# Patient Record
Sex: MALE | Race: BLACK OR AFRICAN AMERICAN | NOT HISPANIC OR LATINO | Employment: OTHER | URBAN - METROPOLITAN AREA
[De-identification: names, ages, dates, MRNs, and addresses within clinical notes are randomized per-mention and may not be internally consistent; named-entity substitution may affect disease eponyms.]

---

## 2017-11-07 ENCOUNTER — FOLLOW UP (OUTPATIENT)
Dept: URBAN - METROPOLITAN AREA CLINIC 11 | Facility: CLINIC | Age: 32
End: 2017-11-07

## 2017-11-07 ASSESSMENT — VISUAL ACUITY: OS_SC: 20/400

## 2017-11-07 ASSESSMENT — TONOMETRY: OS_IOP_MMHG: 22

## 2017-11-07 NOTE — PROCEDURE NOTE: CLINICAL
PROCEDURE NOTE: Avastin () #8 OS. Diagnosis: Proliferative Diabetic Retinopathy. Intravitreal injection of Avastin 1.25mg/0.05 ml was given. Injection site: 3-4 mm from the limbus. Patient tolerated procedure well. CF vision checked. There were no complications. Post-op instructions given. Lot #: [email or web address]. Expiration Date: 6810-90-00U97:00:00. Inventory Id: xi Cerrato

## 2017-11-07 NOTE — PATIENT DISCUSSION
Avastin #8 done today in the left eye. I will see him again in 3 weeks. I would like to give the heme. an opportunity to clear on it's own. If it doesn't, we will discuss vitrectomy surgery.

## 2018-01-02 ENCOUNTER — FOLLOW UP (OUTPATIENT)
Dept: URBAN - METROPOLITAN AREA CLINIC 11 | Facility: CLINIC | Age: 33
End: 2018-01-02

## 2018-01-02 VITALS — DIASTOLIC BLOOD PRESSURE: 70 MMHG | HEIGHT: 60 IN | SYSTOLIC BLOOD PRESSURE: 112 MMHG

## 2018-01-02 ASSESSMENT — VISUAL ACUITY: OS_SC: 20/200

## 2018-01-02 ASSESSMENT — TONOMETRY: OS_IOP_MMHG: 24

## 2018-01-02 NOTE — PROCEDURE NOTE: CLINICAL
PROCEDURE NOTE: Avastin () #9 OS. Diagnosis: Proliferative Diabetic Retinopathy. Anesthesia: Topical/Subconjunctival. Prep: Betadine Drops and Betadine Scrub. Intravitreal injection of Avastin 1.25mg/0.05 ml was given. Injection site: 3-4 mm from the limbus. Patient tolerated procedure well. CF vision checked. There were no complications. Post-op instructions given. Lot #: [email or web address]. Expiration Date: Mon Feb 19 2018 00:00:00 GMT-0500 [place] Standard Time). Inventory Id: null. Junie Perry

## 2018-04-12 ENCOUNTER — FOLLOW UP (OUTPATIENT)
Dept: URBAN - METROPOLITAN AREA CLINIC 11 | Facility: CLINIC | Age: 33
End: 2018-04-12

## 2018-04-12 ASSESSMENT — TONOMETRY
OS_IOP_MMHG: 17
OD_IOP_MMHG: 08

## 2018-04-12 ASSESSMENT — VISUAL ACUITY: OS_SC: 20/100

## 2018-04-19 ENCOUNTER — FOLLOW UP (OUTPATIENT)
Dept: URBAN - METROPOLITAN AREA CLINIC 11 | Facility: CLINIC | Age: 33
End: 2018-04-19

## 2018-04-19 ASSESSMENT — VISUAL ACUITY: OS_SC: 20/400

## 2018-04-19 ASSESSMENT — TONOMETRY: OS_IOP_MMHG: 24

## 2018-04-19 NOTE — PATIENT DISCUSSION
Discussed vitreous heme with patient. Will defer any injection in the left eye today.  I reassured him that the remaining blood in the eye should clear on its own over the next few weeks.

## 2018-05-17 ENCOUNTER — FOLLOW UP (OUTPATIENT)
Dept: URBAN - METROPOLITAN AREA CLINIC 11 | Facility: CLINIC | Age: 33
End: 2018-05-17

## 2018-05-17 ASSESSMENT — TONOMETRY: OS_IOP_MMHG: 23

## 2018-05-17 ASSESSMENT — VISUAL ACUITY: OS_SC: 20/200

## 2018-05-17 NOTE — PATIENT DISCUSSION
Recommended PRP in his left eye today in hopes that we can stop the hemorrhages.  He may need additional PRP his next visit.

## 2018-05-17 NOTE — PROCEDURE NOTE: CLINICAL
PROCEDURE NOTE: PRP OS. Diagnosis: Diabetes, Uncontrolled Type I with Ocular Complications. Anesthesia: Topical. Prior to PRP, risks/benefits/alternatives to laser discussed including loss of vision, decreased peripheral and night vision and patient wished to proceed. Spot size: 200 um. Power: 230 mW. Number of pulses: 564 Duration: 0.10. Patient tolerated procedure well. There were no complications. Post procedure instructions given. Derick Crystal

## 2018-07-12 ENCOUNTER — FOLLOW UP (OUTPATIENT)
Dept: URBAN - METROPOLITAN AREA CLINIC 11 | Facility: CLINIC | Age: 33
End: 2018-07-12

## 2018-07-12 ASSESSMENT — TONOMETRY: OS_IOP_MMHG: 10

## 2018-07-12 ASSESSMENT — VISUAL ACUITY: OS_SC: 20/70-2

## 2018-07-12 NOTE — PATIENT DISCUSSION
Continue current management, lubricant drops as needed, fish oil 3,000 mg by mouth daily, and warm compresses 10 minutes daily.

## 2018-07-12 NOTE — PATIENT DISCUSSION
Recommended Avastin injection today. The injection was given and tolerated well by patient. Post-injection instructions were reviewed and understood by the patient.

## 2018-07-12 NOTE — PROCEDURE NOTE: CLINICAL
PROCEDURE NOTE: Avastin () #10 OS. Diagnosis: Proliferative Diabetic Retinopathy. Anesthesia: Topical. Prep: Betadine Drops and Betadine Scrub. Intravitreal injection of Avastin 1.25mg/0.05 ml was given. Injection site: 3-4 mm from the limbus. Patient tolerated procedure well. CF vision checked. There were no complications. Post-op instructions given. Lot #: [id number]. Expiration Date: . EXP. Inventory Id: xi Sampson

## 2018-12-06 ENCOUNTER — FOLLOW UP (OUTPATIENT)
Dept: URBAN - METROPOLITAN AREA CLINIC 11 | Facility: CLINIC | Age: 33
End: 2018-12-06

## 2018-12-06 ASSESSMENT — TONOMETRY: OS_IOP_MMHG: 22

## 2018-12-06 ASSESSMENT — VISUAL ACUITY
OS_SC: 20/200
OS_PH: 20/60-1

## 2019-01-24 ENCOUNTER — FOLLOW UP (OUTPATIENT)
Dept: URBAN - METROPOLITAN AREA CLINIC 11 | Facility: CLINIC | Age: 34
End: 2019-01-24

## 2019-01-24 ASSESSMENT — VISUAL ACUITY
OS_SC: 20/100+1
OS_PH: 20/70

## 2019-01-24 ASSESSMENT — TONOMETRY: OS_IOP_MMHG: 19

## 2019-01-24 NOTE — PATIENT DISCUSSION
Discussed with patient that the hemorrhage is clearing slowly in his left eye.  I advised him to keep his appointment for the next 1-2 weeks for Avastin injection in the left eye.

## 2019-04-25 ENCOUNTER — FOLLOW UP (OUTPATIENT)
Dept: URBAN - METROPOLITAN AREA CLINIC 11 | Facility: CLINIC | Age: 34
End: 2019-04-25

## 2019-04-25 ASSESSMENT — TONOMETRY: OS_IOP_MMHG: 16

## 2019-04-25 ASSESSMENT — VISUAL ACUITY: OS_SC: 20/70-2

## 2019-04-25 NOTE — PATIENT DISCUSSION
Patient will be seeing Dr. Kunal Marcus in the next few weeks for refraction.  I will plan to see him againin 2 months.

## 2019-07-18 ENCOUNTER — FOLLOW UP (OUTPATIENT)
Dept: URBAN - METROPOLITAN AREA CLINIC 11 | Facility: CLINIC | Age: 34
End: 2019-07-18

## 2019-07-18 ASSESSMENT — VISUAL ACUITY: OS_SC: 20/40-2

## 2019-07-18 ASSESSMENT — TONOMETRY: OS_IOP_MMHG: 09

## 2019-07-18 NOTE — PROCEDURE NOTE: CLINICAL
PROCEDURE NOTE: Avastin () #15 OS. Diagnosis: Proliferative Diabetic Retinopathy. Anesthesia: Topical/Subconjunctival. Prep: Betadine Drops and Betadine Scrub. Intravitreal injection of Avastin 1.25mg/0.05 ml was given. Injection site: 3-4 mm from the limbus. Patient tolerated procedure well. CF vision checked. There were no complications. Post-op instructions given. Lot #: [id number]. Expiration Date: Sun Sep 01 2019 00:00:00 [address] Daylight Time). Inventory Id: null. Corona Micki

## 2019-11-05 ENCOUNTER — FOLLOW UP (OUTPATIENT)
Dept: URBAN - METROPOLITAN AREA CLINIC 11 | Facility: CLINIC | Age: 34
End: 2019-11-05

## 2019-11-05 ASSESSMENT — VISUAL ACUITY: OS_CC: 20/40-2

## 2019-12-12 ENCOUNTER — FOLLOW UP (OUTPATIENT)
Dept: URBAN - METROPOLITAN AREA CLINIC 11 | Facility: CLINIC | Age: 34
End: 2019-12-12

## 2019-12-12 ASSESSMENT — VISUAL ACUITY: OS_SC: 20/400

## 2019-12-12 ASSESSMENT — TONOMETRY: OS_IOP_MMHG: 18

## 2019-12-12 NOTE — PATIENT DISCUSSION
Recommended Avastin injection today.  Discussed benefits, alternatives, realistic expectations and risks .  Patient desires to proceed with Avastin injection today.

## 2019-12-12 NOTE — PROCEDURE NOTE: CLINICAL
PROCEDURE NOTE: Avastin () #16 OS. Diagnosis: Proliferative Diabetic Retinopathy. Anesthesia: Topical/Subconjunctival. Prep: Betadine Drops and Betadine Scrub. Intravitreal injection of Avastin 1.25mg/0.05 ml was given. Injection site: 3-4 mm from the limbus. Patient tolerated procedure well. CF vision checked. There were no complications. Post-op instructions given. Lot #: [id number]. Expiration Date: 01/2020. Inventory Id: xi Catalan

## 2020-06-23 ENCOUNTER — FOLLOW UP (OUTPATIENT)
Dept: URBAN - METROPOLITAN AREA CLINIC 11 | Facility: CLINIC | Age: 35
End: 2020-06-23

## 2020-06-23 ASSESSMENT — TONOMETRY: OS_IOP_MMHG: 14

## 2020-06-23 ASSESSMENT — VISUAL ACUITY: OS_SC: 20/200+1

## 2020-11-19 ENCOUNTER — FOLLOW UP (OUTPATIENT)
Dept: URBAN - METROPOLITAN AREA CLINIC 11 | Facility: CLINIC | Age: 35
End: 2020-11-19

## 2020-11-19 ASSESSMENT — TONOMETRY: OS_IOP_MMHG: 15

## 2020-11-19 ASSESSMENT — VISUAL ACUITY: OS_SC: 20/200

## 2021-02-02 ENCOUNTER — FOLLOW UP (OUTPATIENT)
Dept: URBAN - METROPOLITAN AREA CLINIC 11 | Facility: CLINIC | Age: 36
End: 2021-02-02

## 2021-02-02 ASSESSMENT — VISUAL ACUITY: OS_SC: 20/200

## 2021-02-02 ASSESSMENT — TONOMETRY: OS_IOP_MMHG: 14

## 2021-02-02 NOTE — PATIENT DISCUSSION
Patient ready for cataract evaluation and possible surgery of his left eye. I have recommended him see Dr. Juana Gutierrez at his earliest convivence. The patient can be reached at [phone].

## 2021-04-06 ENCOUNTER — FOLLOW UP (OUTPATIENT)
Dept: URBAN - METROPOLITAN AREA CLINIC 11 | Facility: CLINIC | Age: 36
End: 2021-04-06

## 2021-04-06 ASSESSMENT — VISUAL ACUITY: OS_SC: 20/100

## 2021-04-06 ASSESSMENT — TONOMETRY: OS_IOP_MMHG: 13

## 2021-04-06 NOTE — PATIENT DISCUSSION
25 minutes spent in dialogue with Mr. , His retinopathy remains stable and is cleared to proceed with cataract surgery as scheduled. I will see him in 2 months.

## 2021-07-15 ENCOUNTER — FOLLOW UP (OUTPATIENT)
Dept: URBAN - METROPOLITAN AREA CLINIC 11 | Facility: CLINIC | Age: 36
End: 2021-07-15

## 2021-07-15 DIAGNOSIS — E10.3533: ICD-10-CM

## 2021-07-15 DIAGNOSIS — E10.3512: ICD-10-CM

## 2021-07-15 DIAGNOSIS — H43.12: ICD-10-CM

## 2021-07-15 PROCEDURE — 67028 INJECTION EYE DRUG: CPT

## 2021-07-15 PROCEDURE — 99214 OFFICE O/P EST MOD 30 MIN: CPT

## 2021-07-15 PROCEDURE — 76512 OPH US DX B-SCAN: CPT

## 2021-07-15 ASSESSMENT — TONOMETRY: OS_IOP_MMHG: 17

## 2021-07-15 ASSESSMENT — VISUAL ACUITY: OS_SC: CF 1FT

## 2021-07-15 NOTE — PATIENT DISCUSSION
30 minutes spent in face to face dialogue with Mr. Connors. I have recommended that he have an injection of Avastin while he is here today, due to a re-current hemorrhage. I will see him in 3 weeks for re-evaluation.

## 2021-07-15 NOTE — PROCEDURE NOTE: CLINICAL
PROCEDURE NOTE: Avastin () #18 OS. Diagnosis: Proliferative Diabetic Retinopathy. Anesthesia: Topical/Subconjunctival. Prep: Betadine Drops and Betadine Scrub. Intravitreal injection of Avastin 1.25mg/0.05 ml was given. Injection site: 3-4 mm from the limbus. Patient tolerated procedure well. CF vision checked. There were no complications. Post-op instructions given. Lot #: [id number]. Expiration Date: 9885-40-94G38:00:00. Inventory Id: xi Sampson

## 2021-10-05 ENCOUNTER — FOLLOW UP (OUTPATIENT)
Dept: URBAN - METROPOLITAN AREA CLINIC 11 | Facility: CLINIC | Age: 36
End: 2021-10-05

## 2021-10-05 DIAGNOSIS — E10.3512: ICD-10-CM

## 2021-10-05 DIAGNOSIS — H43.12: ICD-10-CM

## 2021-10-05 DIAGNOSIS — E10.3533: ICD-10-CM

## 2021-10-05 PROCEDURE — 99214 OFFICE O/P EST MOD 30 MIN: CPT

## 2021-10-05 ASSESSMENT — VISUAL ACUITY: OS_SC: 20/200

## 2021-10-05 ASSESSMENT — TONOMETRY: OS_IOP_MMHG: 17

## 2021-10-05 NOTE — PATIENT DISCUSSION
35 minutes spent in face to face dialogue with Mr. Connors. I have deferred treatment today and he will return to see me in 6 weeks.  I reassured him that the hemorrhage is clearing slowly.

## 2021-11-16 ENCOUNTER — FOLLOW UP (OUTPATIENT)
Dept: URBAN - METROPOLITAN AREA CLINIC 11 | Facility: CLINIC | Age: 36
End: 2021-11-16

## 2021-11-16 DIAGNOSIS — E10.3512: ICD-10-CM

## 2021-11-16 DIAGNOSIS — H43.12: ICD-10-CM

## 2021-11-16 PROCEDURE — 99213 OFFICE O/P EST LOW 20 MIN: CPT

## 2021-11-16 PROCEDURE — 92134 CPTRZ OPH DX IMG PST SGM RTA: CPT

## 2021-11-16 PROCEDURE — 67028 INJECTION EYE DRUG: CPT

## 2021-11-16 ASSESSMENT — TONOMETRY: OS_IOP_MMHG: 24

## 2021-11-16 ASSESSMENT — VISUAL ACUITY: OS_SC: 20/100-1

## 2021-11-16 NOTE — PATIENT DISCUSSION
Recommended Avastin injection in the left eye today. The injection was given and tolerated well by patient. Post-injection instructions were reviewed and understood by the patient.

## 2021-11-16 NOTE — PROCEDURE NOTE: CLINICAL
PROCEDURE NOTE: Avastin () #19 OS. Diagnosis: Diabetic Macular Edema. Anesthesia: Topical/Subconjunctival. Prep: Betadine Drops and Betadine Scrub. Intravitreal injection of Avastin 1.25mg/0.05 ml was given. Injection site: 3-4 mm from the limbus. Patient tolerated procedure well. CF vision checked. There were no complications. Post-op instructions given. Lot #: [id number]. Expiration Date: 8303-85-29T88:00:00. Inventory Id: rikki. Pennye Hand

## 2021-11-16 NOTE — PATIENT DISCUSSION
35 minutes spent in face to face dialogue with Mr. Connors. I have recommended Avastin treatment today and he will return to see me in 6 weeks.  I reassured him that the hemorrhage is clearing slowly.

## 2022-05-31 ENCOUNTER — COMPREHENSIVE EXAM (OUTPATIENT)
Dept: URBAN - METROPOLITAN AREA CLINIC 11 | Facility: CLINIC | Age: 37
End: 2022-05-31

## 2022-05-31 DIAGNOSIS — E10.3591: ICD-10-CM

## 2022-05-31 DIAGNOSIS — H40.89: ICD-10-CM

## 2022-05-31 DIAGNOSIS — E10.3512: ICD-10-CM

## 2022-05-31 PROCEDURE — 99214 OFFICE O/P EST MOD 30 MIN: CPT

## 2022-05-31 PROCEDURE — 92134 CPTRZ OPH DX IMG PST SGM RTA: CPT

## 2022-05-31 ASSESSMENT — TONOMETRY: OS_IOP_MMHG: 17

## 2022-05-31 ASSESSMENT — VISUAL ACUITY
OS_PH: 20/70
OS_SC: 20/200

## 2022-05-31 NOTE — PATIENT DISCUSSION
Spent 35 min with patient face to face discussing my findings on exam and OCT today.   No active leakage nor edema.  No treatment is needed today.  I recommend patient see Dr. Liliam Kruse for refraction, we will call to help get patient an appt. .  I will see patient again in 6 mths to re-evaluate with a possibility of Avastin OS.

## 2022-06-20 NOTE — PATIENT DISCUSSION
----- Message from Rosaura Younger sent at 6/19/2022 11:11 PM EDT -----  Regarding: Test results   When will you call about my results?   Retinal exam findings communicated to Physician managing diabetes.

## 2022-06-28 ENCOUNTER — FOLLOW UP (OUTPATIENT)
Dept: URBAN - METROPOLITAN AREA CLINIC 11 | Facility: CLINIC | Age: 37
End: 2022-06-28

## 2022-06-28 DIAGNOSIS — E10.3591: ICD-10-CM

## 2022-06-28 DIAGNOSIS — E10.3512: ICD-10-CM

## 2022-06-28 PROCEDURE — 67028 INJECTION EYE DRUG: CPT

## 2022-06-28 PROCEDURE — 99214 OFFICE O/P EST MOD 30 MIN: CPT

## 2022-06-28 PROCEDURE — 92134 CPTRZ OPH DX IMG PST SGM RTA: CPT

## 2022-06-28 ASSESSMENT — VISUAL ACUITY: OS_SC: 20/200

## 2022-06-28 ASSESSMENT — TONOMETRY: OS_IOP_MMHG: 24

## 2022-06-28 NOTE — PROCEDURE NOTE: CLINICAL
PROCEDURE NOTE: Avastin () #20 OS. Diagnosis: Proliferative Diabetic Retinopathy. Anesthesia: Topical/Subconjunctival. Prep: Betadine Drops and Betadine Scrub. Betadine prep was performed. Product is within expiration date, and has been verified. An unopened 30 g needle was securely affixed to the 1 cc syringe. Excess drug and air bubbles were carefully expressed such that the plunger aligned with the .05 mL steffany on the syringe. A lid speculum was used. After the Betadine prep, intravitreal injection of Avastin 1.25mg/0.05 ml was given. Injection site: 3-4 mm from the limbus. The needle was withdrawn; retinal perfusion was verified. Lid speculum removed. The eye was irrigated with sterile irrigating solution. The betadine was washed away. Patient tolerated procedure well. Count fingers vision was verified. There were no complications. Patient given office phone number/answering service phone number. Post-injection instructions were reviewed and understood. Symptoms of RD and endophthalmitis following intravitreal injection were discussed. Patient advised to call right away if any loss of vision, new floaters, or eye pain. Post-op instructions given. Patient was given the standard instruction sheet. Appropriate follow-up was arranged. Yadira Grider

## 2022-08-02 ENCOUNTER — ESTABLISHED PATIENT (OUTPATIENT)
Dept: URBAN - METROPOLITAN AREA CLINIC 11 | Facility: CLINIC | Age: 37
End: 2022-08-02

## 2022-08-02 DIAGNOSIS — E10.3591: ICD-10-CM

## 2022-08-02 DIAGNOSIS — H40.89: ICD-10-CM

## 2022-08-02 DIAGNOSIS — E10.3512: ICD-10-CM

## 2022-08-02 PROCEDURE — 92134 CPTRZ OPH DX IMG PST SGM RTA: CPT

## 2022-08-02 PROCEDURE — 67028 INJECTION EYE DRUG: CPT

## 2022-08-02 PROCEDURE — 99214 OFFICE O/P EST MOD 30 MIN: CPT

## 2022-08-02 ASSESSMENT — VISUAL ACUITY
OS_SC: 20/200
OU_SC: 20/200

## 2022-08-02 ASSESSMENT — TONOMETRY: OS_IOP_MMHG: 19

## 2022-08-02 NOTE — PROCEDURE NOTE: CLINICAL
PROCEDURE NOTE: Avastin () #21 OS. Diagnosis: Diabetic Macular Edema. Anesthesia: Topical/Subconjunctival. Prep: Betadine Drops and Betadine Scrub. Betadine prep was performed. Product is within expiration date, and has been verified. An unopened 30 g needle was securely affixed to the 1 cc syringe. Excess drug and air bubbles were carefully expressed such that the plunger aligned with the .05 mL steffany on the syringe. A lid speculum was used. After the Betadine prep, intravitreal injection of Avastin 1.25mg/0.05 ml was given. Injection site: 3-4 mm from the limbus. The needle was withdrawn; retinal perfusion was verified. Lid speculum removed. The eye was irrigated with sterile irrigating solution. The betadine was washed away. Patient tolerated procedure well. Count fingers vision was verified. There were no complications. Patient given office phone number/answering service phone number. Post-injection instructions were reviewed and understood. Symptoms of RD and endophthalmitis following intravitreal injection were discussed. Patient advised to call right away if any loss of vision, new floaters, or eye pain. Post-op instructions given. Patient was given the standard instruction sheet. Appropriate follow-up was arranged. Jessi Erazo

## 2022-10-04 ENCOUNTER — ESTABLISHED PATIENT (OUTPATIENT)
Dept: URBAN - METROPOLITAN AREA CLINIC 11 | Facility: CLINIC | Age: 37
End: 2022-10-04

## 2022-10-04 DIAGNOSIS — H43.12: ICD-10-CM

## 2022-10-04 DIAGNOSIS — H40.89: ICD-10-CM

## 2022-10-04 DIAGNOSIS — E10.3512: ICD-10-CM

## 2022-10-04 DIAGNOSIS — E10.3591: ICD-10-CM

## 2022-10-04 PROCEDURE — 99214 OFFICE O/P EST MOD 30 MIN: CPT

## 2022-10-04 PROCEDURE — 92134 CPTRZ OPH DX IMG PST SGM RTA: CPT

## 2022-10-04 PROCEDURE — 92201 OPSCPY EXTND RTA DRAW UNI/BI: CPT

## 2022-10-04 ASSESSMENT — VISUAL ACUITY: OS_CC: 20/40+1

## 2022-10-04 ASSESSMENT — TONOMETRY: OS_IOP_MMHG: 17

## 2022-10-04 NOTE — PATIENT DISCUSSION
Proliferative Diabetic Retinopathy is present on examination.  I recommend patient return for a series of Laser treatments.  Return in 1 month for PRP OS.

## 2022-11-11 ENCOUNTER — CLINIC PROCEDURE ONLY (OUTPATIENT)
Dept: URBAN - METROPOLITAN AREA CLINIC 11 | Facility: CLINIC | Age: 37
End: 2022-11-11

## 2022-11-11 DIAGNOSIS — E10.3512: ICD-10-CM

## 2022-11-11 PROCEDURE — 67228 TREATMENT X10SV RETINOPATHY: CPT

## 2022-11-11 ASSESSMENT — TONOMETRY: OS_IOP_MMHG: 16

## 2022-11-11 ASSESSMENT — VISUAL ACUITY: OS_CC: 20/30-1

## 2022-11-11 NOTE — PROCEDURE NOTE: CLINICAL
PROCEDURE NOTE: PRP OS. Diagnosis: Proliferative Diabetic Retinopathy. Anesthesia: Topical. Prior to PRP, risks/benefits/alternatives to laser discussed including loss of vision, decreased peripheral and night vision and patient wished to proceed. Spot size: 200 um. Power: 240 mW. Number of pulses: 323. Patient tolerated procedure well. There were no complications. Post procedure instructions given. Morgan Gan

## 2022-11-11 NOTE — PATIENT DISCUSSION
PRP (Pan-retinal Photocoagulation Laser) done and tolerated well by patient.   No post op instructions needed.

## 2022-12-01 ENCOUNTER — FOLLOW UP (OUTPATIENT)
Dept: URBAN - METROPOLITAN AREA CLINIC 11 | Facility: CLINIC | Age: 37
End: 2022-12-01

## 2022-12-01 PROCEDURE — 92134 CPTRZ OPH DX IMG PST SGM RTA: CPT

## 2022-12-01 PROCEDURE — 99213 OFFICE O/P EST LOW 20 MIN: CPT

## 2022-12-01 ASSESSMENT — VISUAL ACUITY: OS_CC: 20/40+1

## 2022-12-01 ASSESSMENT — TONOMETRY: OS_IOP_MMHG: 14

## 2022-12-01 NOTE — PATIENT DISCUSSION
No central Edema on exam and oct today.  No intravitreal treatment needed at this time.  Will treat PRN.   Continue to monitor.

## 2022-12-01 NOTE — PATIENT DISCUSSION
Clearing heme.  Explained heme is secondary to PDR and will continue to clear on its own.  Continue to observe.

## 2022-12-01 NOTE — PATIENT DISCUSSION
Advised patient to continue annual exams with Dr. David Diaz for general eye care with updated refraction.

## 2023-04-18 ENCOUNTER — FOLLOW UP (OUTPATIENT)
Dept: URBAN - METROPOLITAN AREA CLINIC 11 | Facility: CLINIC | Age: 38
End: 2023-04-18

## 2023-04-18 DIAGNOSIS — H43.12: ICD-10-CM

## 2023-04-18 DIAGNOSIS — E10.3591: ICD-10-CM

## 2023-04-18 DIAGNOSIS — E10.3512: ICD-10-CM

## 2023-04-18 DIAGNOSIS — H40.89: ICD-10-CM

## 2023-04-18 DIAGNOSIS — H25.13: ICD-10-CM

## 2023-04-18 PROCEDURE — 99213 OFFICE O/P EST LOW 20 MIN: CPT

## 2023-04-18 PROCEDURE — 67028 INJECTION EYE DRUG: CPT

## 2023-04-18 PROCEDURE — 76512 OPH US DX B-SCAN: CPT

## 2023-04-18 ASSESSMENT — TONOMETRY: OS_IOP_MMHG: 13

## 2023-04-18 ASSESSMENT — VISUAL ACUITY: OS_SC: CF 1FT

## 2023-05-23 ENCOUNTER — FOLLOW UP (OUTPATIENT)
Dept: URBAN - METROPOLITAN AREA CLINIC 11 | Facility: CLINIC | Age: 38
End: 2023-05-23

## 2023-05-23 DIAGNOSIS — H43.12: ICD-10-CM

## 2023-05-23 DIAGNOSIS — H40.89: ICD-10-CM

## 2023-05-23 DIAGNOSIS — E10.3512: ICD-10-CM

## 2023-05-23 DIAGNOSIS — E10.3591: ICD-10-CM

## 2023-05-23 DIAGNOSIS — H25.13: ICD-10-CM

## 2023-05-23 PROCEDURE — 99214 OFFICE O/P EST MOD 30 MIN: CPT

## 2023-05-23 PROCEDURE — 92134 CPTRZ OPH DX IMG PST SGM RTA: CPT

## 2023-05-23 ASSESSMENT — VISUAL ACUITY
OS_PH: 20/200
OS_CC: 20/70-1
OS_SC: 20/200

## 2023-05-23 ASSESSMENT — TONOMETRY: OS_IOP_MMHG: 11

## 2023-07-03 ENCOUNTER — FOLLOW UP (OUTPATIENT)
Dept: URBAN - METROPOLITAN AREA CLINIC 11 | Facility: CLINIC | Age: 38
End: 2023-07-03

## 2023-07-03 DIAGNOSIS — H43.12: ICD-10-CM

## 2023-07-03 DIAGNOSIS — E10.3512: ICD-10-CM

## 2023-07-03 DIAGNOSIS — H25.13: ICD-10-CM

## 2023-07-03 PROCEDURE — 99213 OFFICE O/P EST LOW 20 MIN: CPT

## 2023-07-03 PROCEDURE — 67028 INJECTION EYE DRUG: CPT

## 2023-07-03 PROCEDURE — 92134 CPTRZ OPH DX IMG PST SGM RTA: CPT

## 2023-07-03 ASSESSMENT — VISUAL ACUITY
OU_CC: 20/70
OS_PH: 20/70
OS_CC: 20/70

## 2023-07-03 ASSESSMENT — TONOMETRY: OS_IOP_MMHG: 14

## 2023-08-15 ENCOUNTER — FOLLOW UP (OUTPATIENT)
Dept: URBAN - METROPOLITAN AREA CLINIC 11 | Facility: CLINIC | Age: 38
End: 2023-08-15

## 2023-08-15 DIAGNOSIS — H43.12: ICD-10-CM

## 2023-08-15 DIAGNOSIS — E10.3512: ICD-10-CM

## 2023-08-15 DIAGNOSIS — H25.13: ICD-10-CM

## 2023-08-15 PROCEDURE — 92134 CPTRZ OPH DX IMG PST SGM RTA: CPT

## 2023-08-15 PROCEDURE — 99213 OFFICE O/P EST LOW 20 MIN: CPT

## 2023-08-15 ASSESSMENT — TONOMETRY: OS_IOP_MMHG: 10

## 2023-08-15 ASSESSMENT — VISUAL ACUITY
OS_PH: 20/70-2
OS_SC: 20/200

## 2023-10-03 ENCOUNTER — EMERGENCY VISIT (OUTPATIENT)
Dept: URBAN - METROPOLITAN AREA CLINIC 11 | Facility: CLINIC | Age: 38
End: 2023-10-03

## 2023-10-03 DIAGNOSIS — H40.89: ICD-10-CM

## 2023-10-03 DIAGNOSIS — H43.12: ICD-10-CM

## 2023-10-03 DIAGNOSIS — E10.3512: ICD-10-CM

## 2023-10-03 DIAGNOSIS — E10.3591: ICD-10-CM

## 2023-10-03 PROCEDURE — 92134 CPTRZ OPH DX IMG PST SGM RTA: CPT

## 2023-10-03 PROCEDURE — 99213 OFFICE O/P EST LOW 20 MIN: CPT

## 2023-10-03 ASSESSMENT — TONOMETRY: OS_IOP_MMHG: 20

## 2023-10-03 ASSESSMENT — VISUAL ACUITY: OS_CC: 20/60+2

## 2024-02-29 ENCOUNTER — ESTABLISHED PATIENT (OUTPATIENT)
Dept: URBAN - METROPOLITAN AREA CLINIC 11 | Facility: CLINIC | Age: 39
End: 2024-02-29

## 2024-02-29 DIAGNOSIS — H43.12: ICD-10-CM

## 2024-02-29 DIAGNOSIS — E10.3531: ICD-10-CM

## 2024-02-29 DIAGNOSIS — E10.3512: ICD-10-CM

## 2024-02-29 DIAGNOSIS — H40.89: ICD-10-CM

## 2024-02-29 PROCEDURE — 99214 OFFICE O/P EST MOD 30 MIN: CPT

## 2024-02-29 PROCEDURE — 92134 CPTRZ OPH DX IMG PST SGM RTA: CPT

## 2024-02-29 ASSESSMENT — TONOMETRY: OS_IOP_MMHG: 14

## 2024-02-29 ASSESSMENT — VISUAL ACUITY: OS_CC: 20/30-2

## 2024-06-13 ENCOUNTER — COMPREHENSIVE EXAM (OUTPATIENT)
Dept: URBAN - METROPOLITAN AREA CLINIC 11 | Facility: CLINIC | Age: 39
End: 2024-06-13

## 2024-06-13 DIAGNOSIS — E10.3512: ICD-10-CM

## 2024-06-13 DIAGNOSIS — H43.12: ICD-10-CM

## 2024-06-13 DIAGNOSIS — E10.3531: ICD-10-CM

## 2024-06-13 DIAGNOSIS — H40.89: ICD-10-CM

## 2024-06-13 PROCEDURE — 92134 CPTRZ OPH DX IMG PST SGM RTA: CPT

## 2024-06-13 PROCEDURE — 99214 OFFICE O/P EST MOD 30 MIN: CPT

## 2024-06-13 ASSESSMENT — TONOMETRY: OS_IOP_MMHG: 13

## 2024-06-13 ASSESSMENT — VISUAL ACUITY: OS_CC: 20/40-2

## 2025-02-06 ENCOUNTER — COMPREHENSIVE EXAM (OUTPATIENT)
Age: 40
End: 2025-02-06

## 2025-02-06 DIAGNOSIS — H40.89: ICD-10-CM

## 2025-02-06 DIAGNOSIS — E10.3512: ICD-10-CM

## 2025-02-06 DIAGNOSIS — E10.3531: ICD-10-CM

## 2025-02-06 DIAGNOSIS — H43.12: ICD-10-CM

## 2025-02-06 PROCEDURE — 67028 INJECTION EYE DRUG: CPT

## 2025-02-06 PROCEDURE — 99214 OFFICE O/P EST MOD 30 MIN: CPT | Mod: 25

## 2025-02-06 PROCEDURE — 92134 CPTRZ OPH DX IMG PST SGM RTA: CPT

## 2025-03-06 ENCOUNTER — FOLLOW UP (OUTPATIENT)
Age: 40
End: 2025-03-06

## 2025-03-06 DIAGNOSIS — H25.13: ICD-10-CM

## 2025-03-06 DIAGNOSIS — H43.12: ICD-10-CM

## 2025-03-06 DIAGNOSIS — E10.3512: ICD-10-CM

## 2025-03-06 DIAGNOSIS — H40.89: ICD-10-CM

## 2025-03-06 DIAGNOSIS — E10.3531: ICD-10-CM

## 2025-03-06 PROCEDURE — 99213 OFFICE O/P EST LOW 20 MIN: CPT

## 2025-03-06 PROCEDURE — 92134 CPTRZ OPH DX IMG PST SGM RTA: CPT

## 2025-05-08 ENCOUNTER — COMPREHENSIVE EXAM (OUTPATIENT)
Age: 40
End: 2025-05-08

## 2025-05-08 DIAGNOSIS — H25.13: ICD-10-CM

## 2025-05-08 DIAGNOSIS — E10.3531: ICD-10-CM

## 2025-05-08 DIAGNOSIS — H40.89: ICD-10-CM

## 2025-05-08 DIAGNOSIS — E10.3512: ICD-10-CM

## 2025-05-08 DIAGNOSIS — H43.12: ICD-10-CM

## 2025-05-08 PROCEDURE — 92134 CPTRZ OPH DX IMG PST SGM RTA: CPT

## 2025-05-08 PROCEDURE — 99214 OFFICE O/P EST MOD 30 MIN: CPT
